# Patient Record
(demographics unavailable — no encounter records)

---

## 2020-02-15 NOTE — RAD
EXAM DESCRIPTION: Chest,2 Views



CLINICAL HISTORY: COUGH



COMPARISON: None



TECHNIQUE: PA/lateral



FINDINGS: Prominent cardiothymic silhouette with normal pulmonary

vascularity. No pleural effusion or pneumothorax. Lungs are clear

with no consolidating infiltrate. Lateral view shows intact

sternum and T-spine.



IMPRESSION:



No acute process is identified in the chest.



Electronically signed by:  Yosvany Cheng MD  2/15/2020 7:14

AM CST Workstation: [id number]